# Patient Record
Sex: FEMALE | Race: WHITE | URBAN - METROPOLITAN AREA
[De-identification: names, ages, dates, MRNs, and addresses within clinical notes are randomized per-mention and may not be internally consistent; named-entity substitution may affect disease eponyms.]

---
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
## 2022-02-10 ENCOUNTER — APPOINTMENT (RX ONLY)
Dept: URBAN - METROPOLITAN AREA CLINIC 12 | Facility: CLINIC | Age: 22
Setting detail: DERMATOLOGY
End: 2022-02-10

## 2022-02-10 DIAGNOSIS — L70.0 ACNE VULGARIS: ICD-10-CM | Status: STABLE

## 2022-02-10 DIAGNOSIS — L73.2 HIDRADENITIS SUPPURATIVA: ICD-10-CM

## 2022-02-10 PROCEDURE — ? TREATMENT REGIMEN

## 2022-02-10 PROCEDURE — ? MEDICATION COUNSELING

## 2022-02-10 PROCEDURE — ? COUNSELING

## 2022-02-10 PROCEDURE — 99213 OFFICE O/P EST LOW 20 MIN: CPT

## 2022-02-10 PROCEDURE — ? OTHER

## 2022-02-10 PROCEDURE — ? DIAGNOSIS COMMENT

## 2022-02-10 PROCEDURE — ? HUMIRA INITIATION

## 2022-02-10 NOTE — PROCEDURE: TREATMENT REGIMEN
Continue Regimen: Plexion cleanser\\nAzithromycin M-W-F\\naczone gel mornings\\ndifferin cream 0.1% nightly

## 2022-02-10 NOTE — PROCEDURE: DIAGNOSIS COMMENT
Comment: ACNE OF FACE AND BACK
Detail Level: Simple
Comment: RESUBMIT FOR HUMIRA WITH UPDATED INSURANCE INFO.
Render Risk Assessment In Note?: no

## 2022-02-10 NOTE — PROCEDURE: HUMIRA INITIATION
Is Cyclosporine Contraindicated?: No
Pregnancy And Lactation Warning Text: This medication is Pregnancy Category B and is considered safe during pregnancy. It is unknown if this medication is excreted in breast milk.
Humira Dosing: 160 mg SC day 1, 80 mg SC day 15, then 40 mg SC every week starting on day 29
Detail Level: Zone
Humira Monitoring Guidelines: A yearly test for tuberculosis is required while taking Humira.
Initial Cbc (Optional): 11/16/21
Diagnosis (Required): Hidradenitis Suppurativa

## 2022-02-10 NOTE — PROCEDURE: TREATMENT REGIMEN
Plan: Discussed potential for accutane that may benefit acne and HS.  She is sexually active but not willing to start birth control Other Instructions: Discussed potential for accutane that may benefit acne and HS.  She is sexually active but not willing to start birth control

## 2022-04-27 NOTE — PROCEDURE: MEDICATION COUNSELING
MSK XR negative - No fracture, No dislocation, No foreign body Doxepin Pregnancy And Lactation Text: This medication is Pregnancy Category C and it isn't known if it is safe during pregnancy. It is also excreted in breast milk and breast feeding isn't recommended.

## 2022-04-28 NOTE — PROCEDURE: MIPS QUALITY
[address] [place] [address] CARE  [facility] [address]  [address] [address]  Dept: [phone]  Dept Fax: [phone]    Shadi Vega is a 41 y.o. female who presents today for her medical conditions/complaints as noted below. Chief Complaint   Patient presents with    Depression    Insomnia     has been up for 39 hours, medicaiton not helping    Shoulder Pain     muscle relaxer not helping       HPI:     Patient presents to the office for medication recheck. At her last visit she was started on Cymbalta for management of depression and chronic pain. This medication has been slightly beneficial for depression, however unfortunately she is having significant side effects. She states she is unable to sleep secondary to this medication. She has been up for over 1 day straight. She does not feel the benefit is worth side effects. Patient was also started on meloxicam 7.5 mg once daily for arthralgias. Patient reports she has had a positive response to this medicine. It is helping her chronic pain however she still has daily pain that is limiting her. Pain is predominantly in the left posterior shoulder, scapula, thoracic region. But she does have intermittent pains of other joints. She reports Zanaflex is ineffective. No other complaints or concerns. BP stable. Weight stable.       Hemoglobin A1C (%)   Date Value   04/19/2022 5.2             ( goal A1C is < 7)   No results found for: LABMICR  LDL Cholesterol (mg/dL)   Date Value   04/19/2022 142 (H)       (goal LDL is <100)   AST (U/L)   Date Value   04/19/2022 21     ALT (U/L)   Date Value   04/19/2022 35 (H)     BUN (mg/dL)   Date Value   04/19/2022 10     BP Readings from Last 3 Encounters:   04/28/22 130/88   04/15/22 122/76   03/19/22 (!) 148/101          (goal 120/80)    Past Medical History:   Diagnosis Date    Back pain     herniated disc    Kidney infection     Kidney stone     Migraines
Detail Level: Detailed
Quality 226: Preventive Care And Screening: Tobacco Use: Screening And Cessation Intervention: Patient screened for tobacco use and is an ex/non-smoker
 Ovarian cyst       Past Surgical History:   Procedure Laterality Date    DILATION AND CURETTAGE OF UTERUS      ENDOMETRIAL ABLATION      HYSTERECTOMY      TUBAL LIGATION         History reviewed. No pertinent family history. Social History     Tobacco Use    Smoking status: Never Smoker    Smokeless tobacco: Never Used    Tobacco comment: uses vape   Substance Use Topics    Alcohol use: No      Current Outpatient Medications   Medication Sig Dispense Refill    buPROPion (WELLBUTRIN XL) 150 MG extended release tablet Take 1 tablet by mouth every morning 30 tablet 0    meloxicam (MOBIC) 15 MG tablet Take 1 tablet by mouth daily as needed for Pain 30 tablet 0    hydrOXYzine (ATARAX) 25 MG tablet Take 1 tablet by mouth nightly 30 tablet 0    ondansetron (ZOFRAN ODT) 4 MG disintegrating tablet Take 1 tablet by mouth every 8 hours as needed for Nausea 10 tablet 0    Cyanocobalamin (VITAMIN B 12 PO) Take by mouth 2 times daily      acetaminophen (TYLENOL) 500 MG tablet Take 1,000 mg by mouth every 6 hours as needed for Pain.  gabapentin (NEURONTIN) 300 MG capsule Take 1 capsule by mouth three times daily for 30 days. 90 capsule 0     No current facility-administered medications for this visit.      Allergies   Allergen Reactions    Latex     Imitrex [Sumatriptan]     Pcn [Penicillins] Other (See Comments)     \"Upset stomach\"    Zoloft [Sertraline Hcl]     Zomig [Zolmitriptan]        Health Maintenance   Topic Date Due    Varicella vaccine (1 of 2 - 2-dose childhood series) Never done    COVID-19 Vaccine (1) Never done    HIV screen  Never done    Hepatitis C screen  Never done    DTaP/Tdap/Td vaccine (1 - Tdap) Never done    Flu vaccine (Season Ended) 09/01/2022    Depression Monitoring  04/28/2023    Hepatitis A vaccine  Aged Out    Hepatitis B vaccine  Aged Out    Hib vaccine  Aged Out    Meningococcal (ACWY) vaccine  Aged Out    Pneumococcal 0-64 years Vaccine  Aged Out
Quality 431: Preventive Care And Screening: Unhealthy Alcohol Use - Screening: Patient screened for unhealthy alcohol use using a single question and scores less than 2 times per year
Subjective:      Review of Systems   Constitutional: Negative for chills, fatigue and fever. HENT: Negative for congestion, rhinorrhea and sinus pain. Respiratory: Negative for cough and shortness of breath. Cardiovascular: Negative for chest pain and leg swelling. Gastrointestinal: Negative for abdominal pain, constipation, diarrhea, nausea and vomiting. Genitourinary: Negative for difficulty urinating, frequency and urgency. Musculoskeletal: Positive for arthralgias, myalgias and neck pain. Negative for back pain. Neurological: Negative for dizziness and headaches. Psychiatric/Behavioral: Positive for dysphoric mood. Negative for confusion and sleep disturbance. The patient is not nervous/anxious. All other systems reviewed and are negative. Objective:     Physical Exam  Vitals and nursing note reviewed. Constitutional:       General: She is not in acute distress. Appearance: Normal appearance. She is obese. HENT:      Head: Normocephalic. Mouth/Throat:      Mouth: Mucous membranes are moist.   Eyes:      Extraocular Movements: Extraocular movements intact. Conjunctiva/sclera: Conjunctivae normal.      Pupils: Pupils are equal, round, and reactive to light. Cardiovascular:      Rate and Rhythm: Normal rate and regular rhythm. Pulses: Normal pulses. Heart sounds: Normal heart sounds. Pulmonary:      Effort: Pulmonary effort is normal.      Breath sounds: Normal breath sounds. Abdominal:      General: Abdomen is flat. Bowel sounds are normal.      Palpations: Abdomen is soft. Tenderness: There is no abdominal tenderness. Musculoskeletal:      Cervical back: Normal range of motion. Right lower leg: No edema. Left lower leg: No edema. Comments: Cervical spine: No erythema, edema, ecchymosis. No deformity. Limited range of motion secondary to pain. Negative Spurling sign.   There is tenderness to light palpation of the trapezius
Quality 130: Documentation Of Current Medications In The Medical Record: Current Medications Documented
muscle on the left and scapular region. Left shoulder: Active range of motion is very limited secondary to pain. Passive range of motion is near full. Exam is difficult secondary to patient's pain threshold. Distal pulses 2+. Gross sensation intact.  strength 5/5. Lymphadenopathy:      Cervical: No cervical adenopathy. Skin:     General: Skin is warm. Capillary Refill: Capillary refill takes less than 2 seconds. Neurological:      General: No focal deficit present. Mental Status: She is alert and oriented to person, place, and time. Psychiatric:         Mood and Affect: Mood is depressed. Affect is tearful. Behavior: Behavior normal.       /88 (Site: Left Upper Arm, Position: Sitting, Cuff Size: Large Adult)   Pulse 83   Resp 16   Ht 5' 3\" (1.6 m)   Wt 251 lb 6.4 oz (114 kg)   LMP 09/16/2017   SpO2 97%   Breastfeeding No   BMI 44.53 kg/m²     Assessment:       ICD-10-CM    1. Moderate episode of recurrent major depressive disorder (HCC)  F33.1 buPROPion (WELLBUTRIN XL) 150 MG extended release tablet   2. PTSD (post-traumatic stress disorder)  F43.10    3. Repetitive strain injury of cervical spine, sequela  S16. 1XXS meloxicam (MOBIC) 15 MG tablet    X50. 3XXS             Plan:       1,2. Depression did improve with Cymbalta, however she is having side effects affecting quality of sleep. Plan to switch to Wellbutrin 150 mg once daily. I advised on instructions and side effects. I recommend she keep appointment with counseling. 3.  Discussed increasing meloxicam to 15 mg once daily for management of chronic arthralgias. We reviewed her negative cervical spine and left shoulder MRIs. Advised on elevated CRP seen on prior labs to suggest possible inflammatory process. She was given referral to rheumatology. Return in about 4 weeks (around 5/26/2022) for medication recheck. No orders of the defined types were placed in this encounter.         Patient given
educational materials - see patient instructions. Discussed use, benefit, and side effects of prescribedmedications. All patient questions answered. Pt voiced understanding. Reviewed health maintenance. Instructed to continue current medications, diet and exercise. Patient agreed with treatment plan. Follow up as directed.         [facility] 4/29/2022 at 12:14 PM

## 2022-10-31 ENCOUNTER — APPOINTMENT (RX ONLY)
Dept: URBAN - METROPOLITAN AREA CLINIC 12 | Facility: CLINIC | Age: 22
Setting detail: DERMATOLOGY
End: 2022-10-31

## 2022-10-31 DIAGNOSIS — L70.0 ACNE VULGARIS: ICD-10-CM

## 2022-10-31 DIAGNOSIS — L73.2 HIDRADENITIS SUPPURATIVA: ICD-10-CM | Status: WORSENING

## 2022-10-31 PROCEDURE — ? OTHER

## 2022-10-31 PROCEDURE — ? ORDER TESTS

## 2022-10-31 PROCEDURE — ? PRESCRIPTION

## 2022-10-31 PROCEDURE — ? TREATMENT REGIMEN

## 2022-10-31 PROCEDURE — ? MEDICATION COUNSELING

## 2022-10-31 PROCEDURE — 99214 OFFICE O/P EST MOD 30 MIN: CPT

## 2022-10-31 PROCEDURE — ? HUMIRA INITIATION

## 2022-10-31 PROCEDURE — ? COUNSELING

## 2022-10-31 PROCEDURE — ? DIAGNOSIS COMMENT

## 2022-10-31 PROCEDURE — ? PRESCRIPTION MEDICATION MANAGEMENT

## 2022-10-31 RX ORDER — TAZAROTENE 0.05 MG/G
CREAM CUTANEOUS QHS
Qty: 30 | Refills: 3 | Status: ERX | COMMUNITY
Start: 2022-10-31

## 2022-10-31 RX ORDER — MINOCYCLINE 40 MG/G
AEROSOL, FOAM TOPICAL
Qty: 30 | Refills: 3 | Status: ERX | COMMUNITY
Start: 2022-10-31

## 2022-10-31 RX ADMIN — MINOCYCLINE: 40 AEROSOL, FOAM TOPICAL at 00:00

## 2022-10-31 RX ADMIN — TAZAROTENE: 0.05 CREAM CUTANEOUS at 00:00

## 2022-10-31 NOTE — PROCEDURE: PRESCRIPTION MEDICATION MANAGEMENT
Detail Level: Zone
Render In Strict Bullet Format?: No
Plan: Check labs and resubmit for humira 80 mg every other week.  \\nPt reports her insurance mix up has been corrected.

## 2022-10-31 NOTE — PROCEDURE: MEDICATION COUNSELING
Fax received from pharmacy stating Bupropion XL 300mg is on back order. Requesting 150mg dose be sent 2 tabs daily. Pended. Hydroquinone Pregnancy And Lactation Text: This medication has not been assigned a Pregnancy Risk Category but animal studies failed to show danger with the topical medication. It is unknown if the medication is excreted in breast milk.

## 2022-10-31 NOTE — PROCEDURE: TREATMENT REGIMEN
Plan: Discussed potential for spironolactone or possible accutane that may benefit acne and HS.  She is sexually active but not willing to start birth control; she will consider birth control. Other Instructions: Discussed potential for spironolactone or possible accutane that may benefit acne and HS.  She is sexually active but not willing to start birth control; she will consider birth control.

## 2022-10-31 NOTE — PROCEDURE: MEDICATION COUNSELING
Approved.    Dr. Kodak Smith  11/11/2021 3:43 PM     Hydroxyzine Counseling: Patient advised that the medication is sedating and not to drive a car after taking this medication.  Patient informed of potential adverse effects including but not limited to dry mouth, urinary retention, and blurry vision.  The patient verbalized understanding of the proper use and possible adverse effects of hydroxyzine.  All of the patient's questions and concerns were addressed.

## 2022-10-31 NOTE — PROCEDURE: ORDER TESTS
Expected Date Of Service: 10/31/2022
Bill For Surgical Tray: no
Billing Type: Third-Party Bill
Performing Laboratory: 0

## 2022-11-09 ENCOUNTER — APPOINTMENT (RX ONLY)
Dept: URBAN - METROPOLITAN AREA CLINIC 12 | Facility: CLINIC | Age: 22
Setting detail: DERMATOLOGY
End: 2022-11-09

## 2022-12-05 ENCOUNTER — RX ONLY (OUTPATIENT)
Age: 22
Setting detail: RX ONLY
End: 2022-12-05

## 2022-12-05 RX ORDER — TAZAROTENE 1 MG/G
CREAM TOPICAL
Qty: 60 | Refills: 3 | Status: ERX | COMMUNITY
Start: 2022-12-05

## 2023-01-24 ENCOUNTER — RX ONLY (OUTPATIENT)
Age: 23
Setting detail: RX ONLY
End: 2023-01-24

## 2023-01-24 RX ORDER — TAZAROTENE 1 MG/G
CREAM TOPICAL
Qty: 60 | Refills: 3 | Status: ERX

## 2023-02-09 NOTE — PROCEDURE: MEDICATION COUNSELING
Neuro Hydroquinone Counseling:  Patient advised that medication may result in skin irritation, lightening (hypopigmentation), dryness, and burning.  In the event of skin irritation, the patient was advised to reduce the amount of the drug applied or use it less frequently.  Rarely, spots that are treated with hydroquinone can become darker (pseudoochronosis).  Should this occur, patient instructed to stop medication and call the office. The patient verbalized understanding of the proper use and possible adverse effects of hydroquinone.  All of the patient's questions and concerns were addressed.

## 2023-11-02 NOTE — PROCEDURE: MEDICATION COUNSELING
Continuity-greater than 12 months: Pt recently seen by PCP.      Griseofulvin Pregnancy And Lactation Text: This medication is Pregnancy Category X and is known to cause serious birth defects. It is unknown if this medication is excreted in breast milk but breast feeding should be avoided.

## 2024-03-10 NOTE — PROCEDURE: MEDICATION COUNSELING
10-Mar-2024 12:32 Tetracycline Counseling: Patient counseled regarding possible photosensitivity and increased risk for sunburn.  Patient instructed to avoid sunlight, if possible.  When exposed to sunlight, patients should wear protective clothing, sunglasses, and sunscreen.  The patient was instructed to call the office immediately if the following severe adverse effects occur:  hearing changes, easy bruising/bleeding, severe headache, or vision changes.  The patient verbalized understanding of the proper use and possible adverse effects of tetracycline.  All of the patient's questions and concerns were addressed. Patient understands to avoid pregnancy while on therapy due to potential birth defects.

## 2024-09-25 NOTE — PROCEDURE: MEDICATION COUNSELING
120 Propranolol Pregnancy And Lactation Text: This medication is Pregnancy Category C and it isn't known if it is safe during pregnancy. It is excreted in breast milk.
